# Patient Record
Sex: MALE | Race: WHITE | NOT HISPANIC OR LATINO | Employment: OTHER | URBAN - METROPOLITAN AREA
[De-identification: names, ages, dates, MRNs, and addresses within clinical notes are randomized per-mention and may not be internally consistent; named-entity substitution may affect disease eponyms.]

---

## 2024-02-01 ENCOUNTER — TRANSFERRED RECORDS (OUTPATIENT)
Dept: HEALTH INFORMATION MANAGEMENT | Facility: CLINIC | Age: 84
End: 2024-02-01

## 2024-02-01 LAB — EJECTION FRACTION: NORMAL %

## 2024-08-27 ENCOUNTER — ANCILLARY PROCEDURE (OUTPATIENT)
Dept: GENERAL RADIOLOGY | Facility: CLINIC | Age: 84
End: 2024-08-27
Attending: INTERNAL MEDICINE
Payer: MEDICARE

## 2024-08-27 ENCOUNTER — OFFICE VISIT (OUTPATIENT)
Dept: URGENT CARE | Facility: URGENT CARE | Age: 84
End: 2024-08-27
Payer: MEDICARE

## 2024-08-27 VITALS
OXYGEN SATURATION: 97 % | TEMPERATURE: 97.7 F | WEIGHT: 165 LBS | SYSTOLIC BLOOD PRESSURE: 124 MMHG | HEART RATE: 85 BPM | RESPIRATION RATE: 22 BRPM | DIASTOLIC BLOOD PRESSURE: 67 MMHG

## 2024-08-27 DIAGNOSIS — S69.92XA WRIST INJURY, LEFT, INITIAL ENCOUNTER: Primary | ICD-10-CM

## 2024-08-27 DIAGNOSIS — S69.92XA WRIST INJURY, LEFT, INITIAL ENCOUNTER: ICD-10-CM

## 2024-08-27 PROCEDURE — 73110 X-RAY EXAM OF WRIST: CPT | Mod: LT | Performed by: INTERNAL MEDICINE

## 2024-08-27 PROCEDURE — 99203 OFFICE O/P NEW LOW 30 MIN: CPT | Performed by: INTERNAL MEDICINE

## 2024-08-27 ASSESSMENT — PAIN SCALES - GENERAL: PAINLEVEL: EXTREME PAIN (9)

## 2024-08-27 NOTE — PROGRESS NOTES
SUBJECTIVE:  Hans Hodge is an 84 year old male who presents for wrist injury.  Fell backwards onto but while playing pickleball and tried to catch himself and hurt wrists.  Occurred 2 days ago.  Didn't hurt when fell, but started to hurt later. The left wrist has continued to hurt.  Not able to do much with the left hand.  Some swelling.  No hx of injury or surgery to hand or wrist.  Did not hit head when fell.    PMH:  High cholesterol, depression, htn, cad with stents  There is no problem list on file for this patient.    Social History     Socioeconomic History    Marital status:      Spouse name: None    Number of children: None    Years of education: None    Highest education level: None   Tobacco Use    Smoking status: Never    Smokeless tobacco: Never     No family history on file.  MEDS: plavix, lexapro, simvastatin, norvasc, famotidine, aspirin  ALLERGIES:  Patient has no known allergies.    No current outpatient medications on file.     No current facility-administered medications for this visit.         ROS:  ROS is done and is negative for general/constitutional, eye, ENT, Respiratory, cardiovascular, GI, , Skin, musculoskeletal except as noted elsewhere.  All other review of systems negative except as noted elsewhere.      OBJECTIVE:  /67   Pulse 85   Temp 97.7  F (36.5  C) (Tympanic)   Resp 22   Wt 74.8 kg (165 lb)   SpO2 97%   GENERAL APPEARANCE: Alert, in no acute distress  EYES: normal  SKIN: no ulcers, lesions or rash  MUSCULOSKELETAL:left wrist - moderate tenderness  diffusely, minimal edema, no erythema, no bruising.  Normal rom with mild pain.      RESULTS  Xray left wrist -no fracture noted on my reading.  Recent Results (from the past 48 hour(s))   XR Wrist Left G/E 3 Views    Narrative    XR WRIST LEFT G/E 3 VIEWS  8/27/2024 12:37 PM     HISTORY: Left wrist pain after injury  COMPARISON: None available      Impression    IMPRESSION:  No displaced fracture. There is  left wrist soft tissue swelling.  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 Left wrist chondrocalcinosis.    MONICA MADRIGAL MD         SYSTEM ID:  [id number]       ASSESSMENT/PLAN:    ASSESSMENT / PLAN:  (S69.92XA) Wrist injury, left, initial encounter  (primary encounter diagnosis)  Comment: currently c/with soft tissue injury and/or bone bruise  Plan: XR Wrist Left G/E 3 Views, Wrist/Arm/Hand         Bracing Supplies Order Wrist Brace; Left; with         thumb spica        Splint to use prn for comfort.  I reviewed supportive care, otc meds to use if needed, expected course, and signs of concern.  Follow up as needed or if does not improve within 2 week(s) or if worsens in any way.  Reviewed red flag symptoms and is to go to the ER if experiences any of these.    See [facility] for orders, medications, letters, patient instructions    Cheryl Ortega M.D.

## 2024-10-06 ENCOUNTER — HEALTH MAINTENANCE LETTER (OUTPATIENT)
Age: 84
End: 2024-10-06

## 2025-04-15 ENCOUNTER — TRANSFERRED RECORDS (OUTPATIENT)
Dept: HEALTH INFORMATION MANAGEMENT | Facility: CLINIC | Age: 85
End: 2025-04-15

## 2025-05-06 ENCOUNTER — TRANSFERRED RECORDS (OUTPATIENT)
Dept: HEALTH INFORMATION MANAGEMENT | Facility: CLINIC | Age: 85
End: 2025-05-06
Payer: MEDICARE

## 2025-05-09 ENCOUNTER — TRANSFERRED RECORDS (OUTPATIENT)
Dept: HEALTH INFORMATION MANAGEMENT | Facility: CLINIC | Age: 85
End: 2025-05-09
Payer: MEDICARE

## 2025-05-15 ENCOUNTER — OFFICE VISIT (OUTPATIENT)
Dept: URGENT CARE | Facility: URGENT CARE | Age: 85
End: 2025-05-15
Payer: MEDICARE

## 2025-05-15 ENCOUNTER — NURSE TRIAGE (OUTPATIENT)
Dept: URGENT CARE | Facility: URGENT CARE | Age: 85
End: 2025-05-15
Payer: MEDICARE

## 2025-05-15 VITALS
RESPIRATION RATE: 32 BRPM | HEART RATE: 93 BPM | WEIGHT: 162 LBS | DIASTOLIC BLOOD PRESSURE: 63 MMHG | TEMPERATURE: 97.8 F | SYSTOLIC BLOOD PRESSURE: 108 MMHG | OXYGEN SATURATION: 96 %

## 2025-05-15 DIAGNOSIS — L03.031 PARONYCHIA OF TOE, RIGHT: Primary | ICD-10-CM

## 2025-05-15 RX ORDER — AMLODIPINE BESYLATE 10 MG/1
TABLET ORAL
COMMUNITY

## 2025-05-15 RX ORDER — CLOPIDOGREL BISULFATE 75 MG/1
75 TABLET ORAL DAILY
COMMUNITY

## 2025-05-15 RX ORDER — DOXYCYCLINE HYCLATE 100 MG
100 TABLET ORAL 2 TIMES DAILY
Qty: 14 TABLET | Refills: 0 | Status: SHIPPED | OUTPATIENT
Start: 2025-05-15 | End: 2025-05-22

## 2025-05-15 RX ORDER — LISINOPRIL 5 MG/1
1 TABLET ORAL DAILY
COMMUNITY

## 2025-05-15 RX ORDER — ISOSORBIDE DINITRATE 30 MG/1
30 TABLET ORAL
COMMUNITY

## 2025-05-15 RX ORDER — NITROGLYCERIN 0.4 MG/1
0.4 TABLET SUBLINGUAL
COMMUNITY

## 2025-05-15 RX ORDER — ESCITALOPRAM OXALATE 10 MG/1
10 TABLET ORAL DAILY
COMMUNITY

## 2025-05-15 RX ORDER — SIMVASTATIN 40 MG
TABLET ORAL
COMMUNITY

## 2025-05-15 ASSESSMENT — PAIN SCALES - GENERAL: PAINLEVEL_OUTOF10: SEVERE PAIN (8)

## 2025-05-15 NOTE — PROGRESS NOTES
Urgent Care Clinic Visit    Chief Complaint   Patient presents with    Urgent Care     Ingrown toenail of right big toe                5/15/2025     3:54 PM   Additional Questions   Roomed by SUKHDEV Reyna   Accompanied by Self

## 2025-05-15 NOTE — TELEPHONE ENCOUNTER
Pt's spouse reports pt does not currently have an [facility] PCP, but has an ingrown toenail and requesting referral to Podiatry.    RN spoke to pt and he gave verbal approval for this writer to speak with his spouse, Katey, regarding his toe issue today.    Katey explains that the skin near the lateral border of pt's rt great toenail is red. She states there is NO redness extending down the toe toward the joint, and states there is NO swelling or drainage.  She explains that pt has been in [place], and bothered intermittently by the ingrown toenail for a couple months with varying severity of sx.  She states pt's sx recently worsened, and now he has localized #2/10 pain at rest and #8/10 pain when wearing a shoe.    RN advised pt to be seen in urgent care today to obtain a Podiatry referral, and to determine if it is appropriate to begin an antibiotic while waiting for appointment with Podiatry.    Pt will keep appointment on Monday to establish care with Patricia Loera APRN, CNP.    PRIYANKA ShortN, RN

## 2025-05-15 NOTE — TELEPHONE ENCOUNTER
"Reason for Disposition   MODERATE pain (e.g., limping, interferes with normal activities) and present > 3 days    Additional Information   Negative: Doesn't match the SYMPTOMS for ingrown toenail   Negative: Patient sounds very sick or weak to the triager   Negative: Looks infected (spreading redness, red streak, pus) and fever   Negative: Red streaking and longer than 1 inch (2.5 cm)   Negative: Skin around the nail has become red and larger than 2 inches (5 cm)   Negative: Entire toe is red or swollen   Negative: Yellow pus seen in skin around toenail (cuticle area), or pus seen under toenail    Answer Assessment - Initial Assessment Questions  1. LOCATION: \"Which toe?\"       Rt great toe    2. APPEARANCE: \"What does it look like?\"       Redness on lateral border of rt great toenail, localized to near the nail, not extending to the joint.    3. ONSET: \"When did it start?\"       Couple months, severity has fluctuated.    4. PAIN: \"Is there any pain?\" If Yes, ask: \"How bad is the pain?\"   (Scale 1-10; or mild, moderate, severe)      Pain #2/10 when not wearing a shoe, Pain #8/10 with a shoe on.    5. REDNESS: \"Is there any redness of the skin?\" If Yes, ask: \"How much of the toe is red?\"      Redness near the lateral border of nail.    6. OTHER SYMPTOMS: \"Do you have any other symptoms?\" (e.g., chills, fever, red streak up foot)      DENIES: Swelling, or drainage.    Protocols used: Toenail - Ingrown-A-OH    "

## 2025-05-15 NOTE — PROGRESS NOTES
ASSESSMENT:    (L03.031) Paronychia of toe, right  (primary encounter diagnosis)  Plan: doxycycline hyclate (VIBRA-TABS) 100 MG tablet,        Orthopedic  Referral    PLAN:    Paronychia patient instructions discussed and provided.  Informed the patient to take the antibiotic as prescribed and finish the full course even if symptoms improve.  We discussed trying yogurt with active cultures or probiotic such as Culturelle daily to help prevent diarrhea while taking the antibiotic.  Informed the patient to use warm water soaks four or more times a day.  Discussed the need to follow-up with podiatry for further evaluation and treatment.  Patient acknowledged their understanding of the above plan.    The use of Dragon/MAZation services may have been used to construct the content in this note; any grammatical or spelling errors are non-intentional. Please contact the author of this note directly if you are in need of any clarification.      Bernabe Peter, CURTIS CNP      SUBJECTIVE:  Hans Hodge is a 85 year old adult who presents with tenderness and redness on the inner portion of his right great toe for approximately 3 months.  He has not utilized any treatment    ROS:  Negative except noted above.      OBJECTIVE:  EXAM:    VITALS: Blood pressure 108/63, pulse 93, temperature 97.8  F (36.6  C), temperature source Tympanic, resp. rate (!) 32, weight 73.5 kg (162 lb), SpO2 96%.  GENERAL:  Hans Hodge presents in no apparent distress  Right great toe:  Edema and erythema on the medial aspect.  Indurated and slightly tender to palpation.  No drainage or bleeding noted.  Full ROM.  CMS intact.

## 2025-05-15 NOTE — PATIENT INSTRUCTIONS
Take the antibiotic as prescribed and finish the full course even if symptoms improve.  Try yogurt with active cultures or probiotics such as Culturelle daily to help prevent diarrhea while using antibiotics.  Warm water soaks four times a day.   Follow up with podiatry for further evaluation and treatment.

## 2025-05-19 ENCOUNTER — OFFICE VISIT (OUTPATIENT)
Dept: FAMILY MEDICINE | Facility: CLINIC | Age: 85
End: 2025-05-19
Payer: MEDICARE

## 2025-05-19 VITALS
OXYGEN SATURATION: 97 % | SYSTOLIC BLOOD PRESSURE: 124 MMHG | TEMPERATURE: 97 F | HEART RATE: 62 BPM | HEIGHT: 70 IN | BODY MASS INDEX: 23.79 KG/M2 | DIASTOLIC BLOOD PRESSURE: 77 MMHG | RESPIRATION RATE: 14 BRPM | WEIGHT: 166.2 LBS

## 2025-05-19 DIAGNOSIS — I25.10 CORONARY ARTERY DISEASE INVOLVING NATIVE HEART WITHOUT ANGINA PECTORIS, UNSPECIFIED VESSEL OR LESION TYPE: ICD-10-CM

## 2025-05-19 DIAGNOSIS — G25.0 BENIGN ESSENTIAL TREMOR: ICD-10-CM

## 2025-05-19 DIAGNOSIS — J47.9 BRONCHIECTASIS WITHOUT COMPLICATION (H): ICD-10-CM

## 2025-05-19 DIAGNOSIS — Z00.00 ENCOUNTER FOR MEDICARE ANNUAL WELLNESS EXAM: Primary | ICD-10-CM

## 2025-05-19 DIAGNOSIS — N18.31 STAGE 3A CHRONIC KIDNEY DISEASE (H): ICD-10-CM

## 2025-05-19 DIAGNOSIS — R06.09 DYSPNEA ON EXERTION: ICD-10-CM

## 2025-05-19 DIAGNOSIS — Z86.79 HISTORY OF VENTRICULAR TACHYCARDIA: ICD-10-CM

## 2025-05-19 PROBLEM — I47.20 VENTRICULAR TACHYCARDIA (H): Status: RESOLVED | Noted: 2025-05-19 | Resolved: 2025-05-19

## 2025-05-19 PROBLEM — I47.20 VENTRICULAR TACHYCARDIA (H): Status: ACTIVE | Noted: 2025-05-19

## 2025-05-19 RX ORDER — FAMOTIDINE 20 MG/1
20 TABLET, FILM COATED ORAL 2 TIMES DAILY
COMMUNITY

## 2025-05-19 RX ORDER — ASPIRIN 81 MG/1
TABLET ORAL
COMMUNITY

## 2025-05-19 RX ORDER — MULTIVITAMIN,THER AND MINERALS
1 TABLET ORAL DAILY
COMMUNITY

## 2025-05-19 SDOH — HEALTH STABILITY: PHYSICAL HEALTH: ON AVERAGE, HOW MANY DAYS PER WEEK DO YOU ENGAGE IN MODERATE TO STRENUOUS EXERCISE (LIKE A BRISK WALK)?: 6 DAYS

## 2025-05-19 SDOH — HEALTH STABILITY: PHYSICAL HEALTH: ON AVERAGE, HOW MANY MINUTES DO YOU ENGAGE IN EXERCISE AT THIS LEVEL?: 10 MIN

## 2025-05-19 ASSESSMENT — PAIN SCALES - GENERAL: PAINLEVEL_OUTOF10: NO PAIN (0)

## 2025-05-19 ASSESSMENT — SOCIAL DETERMINANTS OF HEALTH (SDOH): HOW OFTEN DO YOU GET TOGETHER WITH FRIENDS OR RELATIVES?: MORE THAN THREE TIMES A WEEK

## 2025-05-19 NOTE — PROGRESS NOTES
"  {PROVIDER CHARTING PREFERENCE:644614}    Subjective   Surjit is a 85 year old, presenting for the following health issues:  Establish Care and Nail Problem      5/19/2025     2:00 PM   Additional Questions   Roomed by Berenice OLIVA   Accompanied by self     History of Present Illness       Reason for visit:  Get established as a primary care   Surjit is taking medications regularly.      Saw heart doctor in [place], did have stress test, was told to get heart catheter     {MA/LPN/RN Pre-Provider Visit Orders- hCG/UA/Strep (Optional):830711}  {SUPERLIST (Optional):059148}  {additonal problems for provider to add (Optional):870858}    {ROS Picklists (Optional):621069}      Objective    /77   Pulse 62   Temp 97  F (36.1  C) (Tympanic)   Resp 14   Ht 1.778 m (5' 10\")   Wt 75.4 kg (166 lb 3.2 oz)   SpO2 97%   BMI 23.85 kg/m    Body mass index is 23.85 kg/m .  Physical Exam   {Exam List (Optional):557932}    {Diagnostic Test Results (Optional):739882}        Signed Electronically by: CURTIS Lazaro CNP  {Email feedback regarding this note to [email or web address]   :561302}  "

## 2025-05-19 NOTE — PROGRESS NOTES
Preventive Care Visit  [facility]  CURTIS Lazaro CNP, Family Medicine  May 19, 2025      Assessment & Plan     Encounter for Medicare annual wellness exam  next preventative care visit in one year.     Bronchiectasis without complication (H)  -stable, no symptoms today. Had questionable nodule on x-ray last month but follow up CT showed this had resolved.     Coronary artery disease involving native heart without angina pectoris, unspecified vessel or lesion type  Dyspnea on exertion  History of ventricular tachycardia  -he plans to call his cardiologist in [place] back to discuss urgency of the cardiac cath, in meantime sent records request and recommend establishing with cardiology here in [place]. Does not plan to go back to [place] until October.  -ED if worsening dyspnea or chest pain  - Adult Cardiology Eval [facility] Referral; Future    Stage 3a chronic kidney disease (H)  Reviewed outside labs from 4/15/25- Creatinine and eGFR stable     Benign essential tremor  -some worsening symptoms but not affecting quality of life. Was previously treated with propranolol but stopped due to side effects. Declines need for additional intervention at this time.         The longitudinal plan of care for the diagnosis(es)/condition(s) as documented were addressed during this visit. Due to the added complexity in care, I will continue to support Surjit in the subsequent management and with ongoing continuity of care.      Counseling  Appropriate preventive services were addressed with this patient via screening, questionnaire, or discussion as appropriate for fall prevention, nutrition, physical activity, Tobacco-use cessation, social engagement, weight loss and cognition.  Checklist reviewing preventive services available has been given to the patient.  Reviewed patient's diet, addressing concerns and/or questions.           Harman Eddy is a 85 year old, presenting for the  following:  Establish Care and Nail Problem        5/19/2025     2:00 PM   Additional Questions   Roomed by Berenice OLIVA   Accompanied by self         Patient here for yearly preventative care visit. In addition, they have the following concerns:    1. Had stress test done on Friday in [place] then left for [place] due to running out of breath playing pickleball. On Monday was called by his cardiologist in [place] and told he needed a heart cath. History 6 cardiac stents    Sees Dr Rupesh Aquino with [facility] in [place], [place].     Usually spends October-May in [place].     Seeing podiatrist tomorrow for follow up paronychia     Bmp 4/15 creatine 1.27, egfr 55 which is consistant with prior labs on file from 2023    HDL 58  Trig 94  LDL 63    History of Present Illness       Reason for visit:  Get established as a primary care   Surjit is taking medications regularly.             Advance Care Planning    Discussed advance care planning with patient; informed AVS has link to [facility].        5/19/2025   General Health   How would you rate your overall physical health? Excellent   Feel stress (tense, anxious, or unable to sleep) Not at all         5/19/2025   Nutrition   Diet: Regular (no restrictions)         5/19/2025   Exercise   Days per week of moderate/strenous exercise 6 days   Average minutes spent exercising at this level 10 min         5/19/2025   Social Factors   Frequency of gathering with friends or relatives More than three times a week   Worry food won't last until get money to buy more No   Food not last or not have enough money for food? No   Do you have housing? (Housing is defined as stable permanent housing and does not include staying outside in a car, in a tent, in an abandoned building, in an overnight shelter, or couch-surfing.) Yes   Are you worried about losing your housing? No   Lack of transportation? No   Unable to get utilities (heat,electricity)? No          5/19/2025   Fall Risk   Fallen 2 or more times in the past year? No    Trouble with walking or balance? No        Proxy-reported          5/19/2025   Activities of Daily Living- Home Safety   Needs help with the following daily activites None of the above   Safety concerns in the home None of the above         5/19/2025   Dental   Dentist two times every year? Yes         5/19/2025   Hearing Screening   Hearing concerns? None of the above         5/19/2025   Driving Risk Screening   Patient/family members have concerns about driving No         5/19/2025   General Alertness/Fatigue Screening   Have you been more tired than usual lately? No         5/19/2025   Urinary Incontinence Screening   Bothered by leaking urine in past 6 months No         Today's PHQ-2 Score:       5/19/2025    11:07 AM   PHQ-2 ( 1999 [employer])   Q1: Little interest or pleasure in doing things 0   Q2: Feeling down, depressed or hopeless 0   PHQ-2 Score 0    Q1: Little interest or pleasure in doing things Not at all   Q2: Feeling down, depressed or hopeless Not at all   PHQ-2 Score 0       Patient-reported           5/19/2025   Substance Use   Alcohol more than 3/day or more than 7/wk No   Do you have a current opioid prescription? No   How severe/bad is pain from 1 to 10? 2/10   Do you use any other substances recreationally? No     Social History     Tobacco Use    Smoking status: Never    Smokeless tobacco: Never    Tobacco comments:     Smoked about 4 cigarettes a day for 2 yrs [id number]   Substance Use Topics    Alcohol use: Not Currently     Comment: have a beer or wine maybe once a week    Drug use: Never           Reviewed and updated as needed this visit by Provider   Tobacco   Meds  Problems  Med Hx  Surg Hx  Fam Hx  Soc Hx Sexual   Activity            Current providers sharing in care for this patient include:  Patient Care Team:  Patricia Loera APRN CNP as PCP - General (Family Medicine)    The following health maintenance  "items are reviewed in Epic and correct as of today:  Health Maintenance   Topic Date Due    BMP  Never done    LIPID  Never done    MICROALBUMIN  Never done    ADVANCE CARE PLANNING  Never done    HEMOGLOBIN  Never done    URINALYSIS  Never done    ZOSTER IMMUNIZATION (2 of 3) 11/28/2007    RSV VACCINE (1 - 1-dose 75+ series) Never done    DTAP/TDAP/TD IMMUNIZATION (1 - Tdap) 10/11/2020    COVID-19 Vaccine (4 - 2024-25 season) 09/01/2024    INFLUENZA VACCINE (Season Ended) 09/01/2025    MEDICARE ANNUAL WELLNESS VISIT  05/19/2026    ANNUAL REVIEW OF HM ORDERS  05/19/2026    FALL RISK ASSESSMENT  05/19/2026    PHQ-2 (once per calendar year)  Completed    Pneumococcal Vaccine: 50+ Years  Completed    HPV IMMUNIZATION  Aged Out    MENINGITIS IMMUNIZATION  Aged Out            Objective    Exam  /77   Pulse 62   Temp 97  F (36.1  C) (Tympanic)   Resp 14   Ht 1.778 m (5' 10\")   Wt 75.4 kg (166 lb 3.2 oz)   SpO2 97%   BMI 23.85 kg/m     Estimated body mass index is 23.85 kg/m  as calculated from the following:    Height as of this encounter: 1.778 m (5' 10\").    Weight as of this encounter: 75.4 kg (166 lb 3.2 oz).    Physical Exam  Constitutional:       Appearance: Normal appearance. Surjit is not ill-appearing.   HENT:      Right Ear: Tympanic membrane, ear canal and external ear normal.      Left Ear: Tympanic membrane, ear canal and external ear normal.      Nose: Nose normal.      Mouth/Throat:      Mouth: Mucous membranes are moist.      Pharynx: Oropharynx is clear. No oropharyngeal exudate.   Eyes:      Extraocular Movements: Extraocular movements intact.      Pupils: Pupils are equal, round, and reactive to light.   Cardiovascular:      Rate and Rhythm: Normal rate and regular rhythm.      Pulses: Normal pulses.      Heart sounds: Normal heart sounds. No murmur heard.     No friction rub. No gallop.   Pulmonary:      Effort: Pulmonary effort is normal.      Breath sounds: Normal breath sounds. No " wheezing, rhonchi or rales.   Abdominal:      General: Abdomen is flat. Bowel sounds are normal.      Palpations: Abdomen is soft.   Musculoskeletal:         General: Normal range of motion.      Cervical back: Normal range of motion and neck supple.   Lymphadenopathy:      Cervical: No cervical adenopathy.   Skin:     General: Skin is warm and dry.   Neurological:      General: No focal deficit present.      Mental Status: Surjit is alert and oriented to person, place, and time.      Motor: Tremor present.   Psychiatric:         Mood and Affect: Mood normal.         Behavior: Behavior normal.         Thought Content: Thought content normal.         Judgment: Judgment normal.                5/19/2025   Mini Cog   Clock Draw Score 2 Normal   3 Item Recall 1 object recalled   Mini Cog Total Score 3       Normal cognition based on my direct observation during interview and exam.         Signed Electronically by: CURTIS Lazaro CNP

## 2025-05-19 NOTE — PATIENT INSTRUCTIONS
Patient Education   Preventive Care Advice   This is general advice given by our system to help you stay healthy. However, your care team may have specific advice just for you. Please talk to your care team about your preventive care needs.  Nutrition  Eat 5 or more servings of fruits and vegetables each day.  Try wheat bread, brown rice and whole grain pasta (instead of white bread, rice, and pasta).  Get enough calcium and vitamin D. Check the label on foods and aim for 100% of the RDA (recommended daily allowance).  Lifestyle  Exercise at least 150 minutes each week  (30 minutes a day, 5 days a week).  Do muscle strengthening activities 2 days a week. These help control your weight and prevent disease.  No smoking.  Wear sunscreen to prevent skin cancer.  Have a dental exam and cleaning every 6 months.  Yearly exams  See your health care team every year to talk about:  Any changes in your health.  Any medicines your care team has prescribed.  Preventive care, family planning, and ways to prevent chronic diseases.  Shots (vaccines)   HPV shots (up to age 26), if you've never had them before.  Hepatitis B shots (up to age 59), if you've never had them before.  COVID-19 shot: Get this shot when it's due.  Flu shot: Get a flu shot every year.  Tetanus shot: Get a tetanus shot every 10 years.  Pneumococcal, hepatitis A, and RSV shots: Ask your care team if you need these based on your risk.  Shingles shot (for age 50 and up)  General health tests  Diabetes screening:  Starting at age 35, Get screened for diabetes at least every 3 years.  If you are younger than age 35, ask your care team if you should be screened for diabetes.  Cholesterol test: At age 39, start having a cholesterol test every 5 years, or more often if advised.  Bone density scan (DEXA): At age 50, ask your care team if you should have this scan for osteoporosis (brittle bones).  Hepatitis C: Get tested at least once in your life.  STIs (sexually  transmitted infections)  Before age 24: Ask your care team if you should be screened for STIs.  After age 24: Get screened for STIs if you're at risk. You are at risk for STIs (including HIV) if:  You are sexually active with more than one person.  You don't use condoms every time.  You or a partner was diagnosed with a sexually transmitted infection.  If you are at risk for HIV, ask about PrEP medicine to prevent HIV.  Get tested for HIV at least once in your life, whether you are at risk for HIV or not.  Cancer screening tests  Cervical cancer screening: If you have a cervix, begin getting regular cervical cancer screening tests starting at age 21.  Breast cancer scan (mammogram): If you've ever had breasts, begin having regular mammograms starting at age 40. This is a scan to check for breast cancer.  Colon cancer screening: It is important to start screening for colon cancer at age 45.  Have a colonoscopy test every 10 years (or more often if you're at risk) Or, ask your provider about stool tests like a FIT test every year or Cologuard test every 3 years.  To learn more about your testing options, visit:   .  For help making a decision, visit:   https://bit.ly/vw39178.  Prostate cancer screening test: If you have a prostate, ask your care team if a prostate cancer screening test (PSA) at age 55 is right for you.  Lung cancer screening: If you are a current or former smoker ages 50 to 80, ask your care team if ongoing lung cancer screenings are right for you.  For informational purposes only. Not to replace the advice of your health care provider. Copyright   2023 [facility] [employer]. All rights reserved. Clinically reviewed by the [facility] Transitions Program. [employer] 774702 - REV 01/24.     Patient Education   Preventive Care Advice   This is general advice given by our system to help you stay healthy. However, your care team may have specific advice just for you. Please talk to your care team  about your preventive care needs.  Nutrition  Eat 5 or more servings of fruits and vegetables each day.  Try wheat bread, brown rice and whole grain pasta (instead of white bread, rice, and pasta).  Get enough calcium and vitamin D. Check the label on foods and aim for 100% of the RDA (recommended daily allowance).  Lifestyle  Exercise at least 150 minutes each week  (30 minutes a day, 5 days a week).  Do muscle strengthening activities 2 days a week. These help control your weight and prevent disease.  No smoking.  Wear sunscreen to prevent skin cancer.  Have a dental exam and cleaning every 6 months.  Yearly exams  See your health care team every year to talk about:  Any changes in your health.  Any medicines your care team has prescribed.  Preventive care, family planning, and ways to prevent chronic diseases.  Shots (vaccines)   HPV shots (up to age 26), if you've never had them before.  Hepatitis B shots (up to age 59), if you've never had them before.  COVID-19 shot: Get this shot when it's due.  Flu shot: Get a flu shot every year.  Tetanus shot: Get a tetanus shot every 10 years.  Pneumococcal, hepatitis A, and RSV shots: Ask your care team if you need these based on your risk.  Shingles shot (for age 50 and up)  General health tests  Diabetes screening:  Starting at age 35, Get screened for diabetes at least every 3 years.  If you are younger than age 35, ask your care team if you should be screened for diabetes.  Cholesterol test: At age 39, start having a cholesterol test every 5 years, or more often if advised.  Bone density scan (DEXA): At age 50, ask your care team if you should have this scan for osteoporosis (brittle bones).  Hepatitis C: Get tested at least once in your life.  STIs (sexually transmitted infections)  Before age 24: Ask your care team if you should be screened for STIs.  After age 24: Get screened for STIs if you're at risk. You are at risk for STIs (including HIV) if:  You are  sexually active with more than one person.  You don't use condoms every time.  You or a partner was diagnosed with a sexually transmitted infection.  If you are at risk for HIV, ask about PrEP medicine to prevent HIV.  Get tested for HIV at least once in your life, whether you are at risk for HIV or not.  Cancer screening tests  Cervical cancer screening: If you have a cervix, begin getting regular cervical cancer screening tests starting at age 21.  Breast cancer scan (mammogram): If you've ever had breasts, begin having regular mammograms starting at age 40. This is a scan to check for breast cancer.  Colon cancer screening: It is important to start screening for colon cancer at age 45.  Have a colonoscopy test every 10 years (or more often if you're at risk) Or, ask your provider about stool tests like a FIT test every year or Cologuard test every 3 years.  To learn more about your testing options, visit:   .  For help making a decision, visit:   https://bit.ly/cs90798.  Prostate cancer screening test: If you have a prostate, ask your care team if a prostate cancer screening test (PSA) at age 55 is right for you.  Lung cancer screening: If you are a current or former smoker ages 50 to 80, ask your care team if ongoing lung cancer screenings are right for you.  For informational purposes only. Not to replace the advice of your health care provider. Copyright   2023 [employer]. All rights reserved. Clinically reviewed by the [facility] Transitions Program. [employer] [id number] - REV 01/24.

## 2025-05-20 ENCOUNTER — MYC REFILL (OUTPATIENT)
Dept: FAMILY MEDICINE | Facility: CLINIC | Age: 85
End: 2025-05-20
Payer: MEDICARE

## 2025-05-20 ENCOUNTER — PATIENT OUTREACH (OUTPATIENT)
Dept: CARE COORDINATION | Facility: CLINIC | Age: 85
End: 2025-05-20
Payer: MEDICARE

## 2025-05-20 DIAGNOSIS — I10 BENIGN ESSENTIAL HYPERTENSION: Primary | ICD-10-CM

## 2025-05-20 RX ORDER — AMLODIPINE BESYLATE 10 MG/1
10 TABLET ORAL DAILY
Qty: 90 TABLET | Refills: 1 | Status: SHIPPED | OUTPATIENT
Start: 2025-05-20

## 2025-06-30 ENCOUNTER — TELEPHONE (OUTPATIENT)
Dept: FAMILY MEDICINE | Facility: CLINIC | Age: 85
End: 2025-06-30
Payer: MEDICARE

## 2025-06-30 NOTE — TELEPHONE ENCOUNTER
3rd request for new prescription to refill the nitroglycerin and simvastatin. See notes from 6/26/2025

## 2025-07-01 NOTE — TELEPHONE ENCOUNTER
There is no consent to communicate with anyone but the patient on file. (TD-7/1/2025)     I left a message to return a call to [phone]. When the patient returns a call please remind him of his decision as written below. If he is changing his mind please ask for the name of the medication, strength of tablet and how often he takes each medication he is wanting Patricia to address. Please request what pharmacy he would like this sent to.  Ceci Flores R.N.    Annual wellness visit with Patricia Loera on 5/19/2025.

## 2025-07-02 NOTE — TELEPHONE ENCOUNTER
Reached out to [facility] to inform them that patient is going to reach out to his cardiologist for his nitroglycerin and simvastatin refills. [facility] informed RN that they received new prescription's for both medications yesterday. No additional questions at this time.    Thank you - Usha Ventura, BSN, RN

## 2025-09-03 ENCOUNTER — OFFICE VISIT (OUTPATIENT)
Dept: FAMILY MEDICINE | Facility: CLINIC | Age: 85
End: 2025-09-03
Payer: MEDICARE

## 2025-09-03 VITALS
TEMPERATURE: 98.1 F | HEART RATE: 80 BPM | HEIGHT: 70 IN | OXYGEN SATURATION: 97 % | SYSTOLIC BLOOD PRESSURE: 124 MMHG | RESPIRATION RATE: 18 BRPM | BODY MASS INDEX: 22.9 KG/M2 | WEIGHT: 160 LBS | DIASTOLIC BLOOD PRESSURE: 68 MMHG

## 2025-09-03 DIAGNOSIS — R60.0 BILATERAL LOWER EXTREMITY EDEMA: ICD-10-CM

## 2025-09-03 DIAGNOSIS — G25.0 BENIGN ESSENTIAL TREMOR: Primary | ICD-10-CM

## 2025-09-03 DIAGNOSIS — N18.31 STAGE 3A CHRONIC KIDNEY DISEASE (H): ICD-10-CM

## 2025-09-03 LAB
ALBUMIN UR-MCNC: NEGATIVE MG/DL
APPEARANCE UR: CLEAR
BILIRUB UR QL STRIP: ABNORMAL
COLOR UR AUTO: YELLOW
GLUCOSE UR STRIP-MCNC: NEGATIVE MG/DL
HGB BLD-MCNC: 13.3 G/DL (ref 11.7–17.7)
HGB UR QL STRIP: NEGATIVE
KETONES UR STRIP-MCNC: ABNORMAL MG/DL
LEUKOCYTE ESTERASE UR QL STRIP: NEGATIVE
MCV RBC AUTO: 92.2 FL (ref 78–100)
NITRATE UR QL: NEGATIVE
PH UR STRIP: 5.5 [PH] (ref 5–7)
SP GR UR STRIP: 1.02 (ref 1–1.03)
UROBILINOGEN UR STRIP-ACNC: 0.2 E.U./DL

## 2025-09-03 PROCEDURE — 1126F AMNT PAIN NOTED NONE PRSNT: CPT | Performed by: NURSE PRACTITIONER

## 2025-09-03 PROCEDURE — 36415 COLL VENOUS BLD VENIPUNCTURE: CPT | Performed by: NURSE PRACTITIONER

## 2025-09-03 PROCEDURE — 82570 ASSAY OF URINE CREATININE: CPT | Performed by: NURSE PRACTITIONER

## 2025-09-03 PROCEDURE — 85018 HEMOGLOBIN: CPT | Performed by: NURSE PRACTITIONER

## 2025-09-03 PROCEDURE — 81003 URINALYSIS AUTO W/O SCOPE: CPT | Performed by: NURSE PRACTITIONER

## 2025-09-03 PROCEDURE — 80048 BASIC METABOLIC PNL TOTAL CA: CPT | Performed by: NURSE PRACTITIONER

## 2025-09-03 PROCEDURE — 82043 UR ALBUMIN QUANTITATIVE: CPT | Performed by: NURSE PRACTITIONER

## 2025-09-03 PROCEDURE — 99214 OFFICE O/P EST MOD 30 MIN: CPT | Performed by: NURSE PRACTITIONER

## 2025-09-03 PROCEDURE — 3074F SYST BP LT 130 MM HG: CPT | Performed by: NURSE PRACTITIONER

## 2025-09-03 PROCEDURE — 3078F DIAST BP <80 MM HG: CPT | Performed by: NURSE PRACTITIONER

## 2025-09-03 RX ORDER — PROPRANOLOL HYDROCHLORIDE 10 MG/1
10-20 TABLET ORAL DAILY PRN
Qty: 60 TABLET | Refills: 3 | Status: SHIPPED | OUTPATIENT
Start: 2025-09-03

## 2025-09-03 ASSESSMENT — PAIN SCALES - GENERAL: PAINLEVEL_OUTOF10: NO PAIN (0)

## 2025-09-04 LAB
ANION GAP SERPL CALCULATED.3IONS-SCNC: 12 MMOL/L (ref 7–15)
BUN SERPL-MCNC: 28.8 MG/DL (ref 8–23)
CALCIUM SERPL-MCNC: 9.8 MG/DL (ref 8.8–10.4)
CHLORIDE SERPL-SCNC: 106 MMOL/L (ref 98–107)
CREAT SERPL-MCNC: 1.38 MG/DL (ref 0.51–1.17)
CREAT UR-MCNC: 356 MG/DL
EGFRCR SERPLBLD CKD-EPI 2021: 49 ML/MIN/1.73M2
GLUCOSE SERPL-MCNC: 118 MG/DL (ref 70–99)
HCO3 SERPL-SCNC: 25 MMOL/L (ref 22–29)
MICROALBUMIN UR-MCNC: 16.8 MG/L
MICROALBUMIN/CREAT UR: 4.72 MG/G CR (ref 0–25)
POTASSIUM SERPL-SCNC: 4.1 MMOL/L (ref 3.4–5.3)
SODIUM SERPL-SCNC: 143 MMOL/L (ref 135–145)